# Patient Record
Sex: FEMALE | Race: WHITE | HISPANIC OR LATINO | Employment: UNEMPLOYED | URBAN - METROPOLITAN AREA
[De-identification: names, ages, dates, MRNs, and addresses within clinical notes are randomized per-mention and may not be internally consistent; named-entity substitution may affect disease eponyms.]

---

## 2019-10-23 ENCOUNTER — HOSPITAL ENCOUNTER (EMERGENCY)
Facility: HOSPITAL | Age: 54
Discharge: HOME | End: 2019-10-23
Attending: EMERGENCY MEDICINE
Payer: COMMERCIAL

## 2019-10-23 VITALS
TEMPERATURE: 98 F | HEIGHT: 61 IN | WEIGHT: 118 LBS | SYSTOLIC BLOOD PRESSURE: 118 MMHG | HEART RATE: 70 BPM | RESPIRATION RATE: 16 BRPM | DIASTOLIC BLOOD PRESSURE: 83 MMHG | BODY MASS INDEX: 22.28 KG/M2 | OXYGEN SATURATION: 99 %

## 2019-10-23 DIAGNOSIS — S91.332A PUNCTURE WOUND OF LEFT FOOT, INITIAL ENCOUNTER: Primary | ICD-10-CM

## 2019-10-23 DIAGNOSIS — Z23 NEED FOR TDAP VACCINATION: ICD-10-CM

## 2019-10-23 DIAGNOSIS — M79.673 FOOT PAIN: ICD-10-CM

## 2019-10-23 LAB
B-HCG UR QL: NEGATIVE
CTP QC/QA: YES

## 2019-10-23 PROCEDURE — 90715 TDAP VACCINE 7 YRS/> IM: CPT | Performed by: NURSE PRACTITIONER

## 2019-10-23 PROCEDURE — 90471 IMMUNIZATION ADMIN: CPT | Performed by: NURSE PRACTITIONER

## 2019-10-23 PROCEDURE — 63600175 PHARM REV CODE 636 W HCPCS: Performed by: NURSE PRACTITIONER

## 2019-10-23 PROCEDURE — 99284 EMERGENCY DEPT VISIT MOD MDM: CPT | Mod: 25

## 2019-10-23 PROCEDURE — 81025 URINE PREGNANCY TEST: CPT | Performed by: NURSE PRACTITIONER

## 2019-10-23 RX ADMIN — CLOSTRIDIUM TETANI TOXOID ANTIGEN (FORMALDEHYDE INACTIVATED), CORYNEBACTERIUM DIPHTHERIAE TOXOID ANTIGEN (FORMALDEHYDE INACTIVATED), BORDETELLA PERTUSSIS TOXOID ANTIGEN (GLUTARALDEHYDE INACTIVATED), BORDETELLA PERTUSSIS FILAMENTOUS HEMAGGLUTININ ANTIGEN (FORMALDEHYDE INACTIVATED), BORDETELLA PERTUSSIS PERTACTIN ANTIGEN, AND BORDETELLA PERTUSSIS FIMBRIAE 2/3 ANTIGEN 0.5 ML: 5; 2; 2.5; 5; 3; 5 INJECTION, SUSPENSION INTRAMUSCULAR at 12:10

## 2019-10-23 NOTE — ED NOTES
Patient soaked in betadine/warm water solution, patted dry, Bandaid applied. No redness, streaking, drainage,  or swelling noted to puncture site.

## 2019-10-23 NOTE — DISCHARGE INSTRUCTIONS
Keep the area clean and dry.  Follow-up primary care physician in 2-3 days. Watch for signs of infection including any worsening redness, pus, warmth, or fever.  If you notice these things please follow up with your primary care physician immediately return to emergency department.  Take over-the-counter ibuprofen or Tylenol as labeled as needed for pain. Refer to the additional materials provided for further information including when to return to the emergency department.

## 2019-10-23 NOTE — ED PROVIDER NOTES
Encounter Date: 10/23/2019    SCRIBE #1 NOTE: I, Carri Chappell, am scribing for, and in the presence of,  Homero Ramirez NP. I have scribed the entire note.       History     Chief Complaint   Patient presents with    Foot Injury     pt stepped on a nail with her left foot yesterday morning. Pt is concerned because she is not up to date on tetanus     Janaise Nancy Gómez is a 54 y.o. female who  has a past medical history of Thyroid disease.    The patient presents to the ED due to left foot injury yesterday morning. Patient reports she stepped on a dirty, katty nail at 10 AM yesterday while wearing shoes. She notes nail went through the shoe was able to take nail out. Since incident, patient has attempted to disinfect wound area with alcohol and hydrogen peroxide. Patient denies any fevers, numbness, weakness or decreased sensation. Patient notes she is not up to date on Tetanus shot. Patient is a not diabetic. Denies any other concerns at this time.     The history is provided by the patient.     Review of patient's allergies indicates:   Allergen Reactions    Ancef [cefazolin] Shortness Of Breath     Past Medical History:   Diagnosis Date    Thyroid disease      History reviewed. No pertinent surgical history.  History reviewed. No pertinent family history.  Social History     Tobacco Use    Smoking status: Never Smoker   Substance Use Topics    Alcohol use: Not Currently    Drug use: Not on file     Review of Systems   Constitutional: Negative for fever.   Skin: Positive for wound.   Allergic/Immunologic: Negative for immunocompromised state.   Neurological: Negative for weakness and numbness.   All other systems reviewed and are negative.      Physical Exam     Initial Vitals [10/23/19 1107]   BP Pulse Resp Temp SpO2   (!) 151/67 77 16 98 °F (36.7 °C) 100 %      MAP       --         Physical Exam    Nursing note and vitals reviewed.  Constitutional: She appears well-developed and  well-nourished. No distress.   HENT:   Head: Normocephalic and atraumatic.   Mouth/Throat: Oropharynx is clear and moist.   Eyes: Conjunctivae and EOM are normal. Pupils are equal, round, and reactive to light.   Neck: Normal range of motion. Neck supple.   Cardiovascular: Normal rate, regular rhythm, normal heart sounds and intact distal pulses.   Pulses:       Dorsalis pedis pulses are 2+ on the right side, and 2+ on the left side.        Posterior tibial pulses are 2+ on the right side, and 2+ on the left side.   Dorsalis pedis and posterior tibial pulses equal bilaterally.  Asymptomatic hypertension.   Pulmonary/Chest: Breath sounds normal. No respiratory distress. She has no wheezes. She has no rhonchi. She has no rales.   Abdominal: Soft. Bowel sounds are normal. She exhibits no distension. There is no tenderness.   Musculoskeletal: Normal range of motion. She exhibits no edema or tenderness.   small puncture wound noted to distal apsect of left foot; no infection.  Sensation and movement intact.   Neurological: She is alert and oriented to person, place, and time. She has normal strength. No cranial nerve deficit.   Skin: Skin is warm and dry. Capillary refill takes less than 2 seconds.         ED Course   Procedures  Labs Reviewed   POCT URINE PREGNANCY             Medical Decision Making:   History:   Old Medical Records: I decided to obtain old medical records.  Initial Assessment:   The patient presents to the ED due to left foot injury yesterday morning. Patient reports she stepped on a dirty, katty nail at 10 AM yesterday while wearing shoes. She notes nail went through the shoe was able to take nail out. Since incident, patient has attempted to disinfect wound area with alcohol and hydrogen peroxide. Patient denies any fevers, numbness, weakness or decreased sensation. Patient notes she is not up to date on Tetanus shot. Patient is a not diabetic. Denies any other concerns at this time.          Clinical Tests:   Lab Tests: Ordered and Reviewed  Radiological Study: Reviewed and Ordered  ED Management:  UPT, Xray, tdap   UPT negative Tdap updated in ED.  X-ray shows no acute abnormalities or foreign bodies.  No signs of septic joint or cellulitis.  No signs of vascular or tendon injury.  Patient initially hypertensive in ED.  Denies any symptoms of hypertension including any:  chest pain, shortness of breath, dizziness, visual disturbance, or headache.  Patient's blood pressure most likely elevated due to patient's situation pain. I do not suspect ACS, hypertensive urgency, emergency.  Patient's blood pressure decreased in ED without any acute intervention.  Patient is not a diabetic or immunocompromised and will not be prophylactically placed on antibiotics.  Patient is hemodynamically stable and will be discharged home.  Counseled on wound care and signs of infection and instructed to follow up with PCP or [facility] in 2-3 days.  Patient instructed to return to ED for any concerns or worsening symptoms.  Patient verbalized understanding, compliance, and agreement treatment plan.  Other:   I discussed test(s) with the performing physician.       <> Summary of the Findings: Care of this patient discussed with Dr. Golden who agrees with ED course and disposition.                       Clinical Impression:       ICD-10-CM ICD-9-CM   1. Puncture wound of left foot, initial encounter S91.332A 892.0   2. Foot pain M79.673 729.5   3. Need for Tdap vaccination Z23 V06.1            I, Homero Ramirez, personally performed the services described in this documentation. All medical record entries made by the scribe were at my direction and in my presence.  I have reviewed the chart and agree that the record reflects my personal performance and is accurate and complete. TING Adames.  12:54 PM 10/23/2019                   Homero Ramirez, DYLON  10/23/19 [id number]

## 2019-10-23 NOTE — ED TRIAGE NOTES
Pt presents to ED and reports she stepped on a dirty katty nail with L foot yesterday . Pt states pain is 4/10 at this time. Pt states she has concerns becausd she is not up to date on her Tetanus.